# Patient Record
Sex: MALE | Race: WHITE | Employment: UNEMPLOYED | URBAN - METROPOLITAN AREA
[De-identification: names, ages, dates, MRNs, and addresses within clinical notes are randomized per-mention and may not be internally consistent; named-entity substitution may affect disease eponyms.]

---

## 2017-03-08 ENCOUNTER — HOSPITAL ENCOUNTER (OUTPATIENT)
Age: 57
Setting detail: OUTPATIENT SURGERY
Discharge: HOME | End: 2017-03-08
Attending: SURGERY | Admitting: SURGERY
Payer: MEDICARE

## 2017-03-08 ENCOUNTER — ANESTHESIA EVENT (OUTPATIENT)
Dept: SURGERY | Age: 57
End: 2017-03-08
Payer: MEDICARE

## 2017-03-08 ENCOUNTER — ANESTHESIA (OUTPATIENT)
Dept: SURGERY | Age: 57
End: 2017-03-08
Payer: MEDICARE

## 2017-03-08 VITALS
SYSTOLIC BLOOD PRESSURE: 127 MMHG | OXYGEN SATURATION: 100 % | BODY MASS INDEX: 25.34 KG/M2 | DIASTOLIC BLOOD PRESSURE: 74 MMHG | WEIGHT: 167.19 LBS | TEMPERATURE: 97.9 F | HEIGHT: 68 IN | HEART RATE: 87 BPM | RESPIRATION RATE: 16 BRPM

## 2017-03-08 PROCEDURE — 77030020782 HC GWN BAIR PAWS FLX 3M -B: Performed by: SURGERY

## 2017-03-08 PROCEDURE — 88304 TISSUE EXAM BY PATHOLOGIST: CPT | Performed by: SURGERY

## 2017-03-08 PROCEDURE — 74011250636 HC RX REV CODE- 250/636

## 2017-03-08 PROCEDURE — 77030031140 HC SUT VCRL3 J&J -A: Performed by: SURGERY

## 2017-03-08 PROCEDURE — 77030010507 HC ADH SKN DERMBND J&J -B: Performed by: SURGERY

## 2017-03-08 PROCEDURE — 77030002935 HC SUT MCRYL J&J -C: Performed by: SURGERY

## 2017-03-08 PROCEDURE — 74011000250 HC RX REV CODE- 250: Performed by: SURGERY

## 2017-03-08 PROCEDURE — 76210000021 HC REC RM PH II 0.5 TO 1 HR: Performed by: SURGERY

## 2017-03-08 PROCEDURE — 76060000032 HC ANESTHESIA 0.5 TO 1 HR: Performed by: SURGERY

## 2017-03-08 PROCEDURE — 88305 TISSUE EXAM BY PATHOLOGIST: CPT | Performed by: SURGERY

## 2017-03-08 PROCEDURE — 77030031139 HC SUT VCRL2 J&J -A: Performed by: SURGERY

## 2017-03-08 PROCEDURE — 76010000138 HC OR TIME 0.5 TO 1 HR: Performed by: SURGERY

## 2017-03-08 PROCEDURE — 76210000006 HC OR PH I REC 0.5 TO 1 HR: Performed by: SURGERY

## 2017-03-08 PROCEDURE — 74011250636 HC RX REV CODE- 250/636: Performed by: SURGERY

## 2017-03-08 PROCEDURE — 74011000250 HC RX REV CODE- 250

## 2017-03-08 PROCEDURE — 77030032490 HC SLV COMPR SCD KNE COVD -B: Performed by: SURGERY

## 2017-03-08 PROCEDURE — 77030002966 HC SUT PDS J&J -A: Performed by: SURGERY

## 2017-03-08 PROCEDURE — 77030011640 HC PAD GRND REM COVD -A: Performed by: SURGERY

## 2017-03-08 RX ORDER — PROPOFOL 10 MG/ML
INJECTION, EMULSION INTRAVENOUS AS NEEDED
Status: DISCONTINUED | OUTPATIENT
Start: 2017-03-08 | End: 2017-03-08 | Stop reason: HOSPADM

## 2017-03-08 RX ORDER — ONDANSETRON 2 MG/ML
4 INJECTION INTRAMUSCULAR; INTRAVENOUS ONCE
Status: CANCELLED | OUTPATIENT
Start: 2017-03-08 | End: 2017-03-08

## 2017-03-08 RX ORDER — FENTANYL CITRATE 50 UG/ML
50 INJECTION, SOLUTION INTRAMUSCULAR; INTRAVENOUS
Status: CANCELLED | OUTPATIENT
Start: 2017-03-08

## 2017-03-08 RX ORDER — SODIUM CHLORIDE 0.9 % (FLUSH) 0.9 %
5-10 SYRINGE (ML) INJECTION AS NEEDED
Status: CANCELLED | OUTPATIENT
Start: 2017-03-08

## 2017-03-08 RX ORDER — CEFAZOLIN SODIUM 2 G/50ML
2 SOLUTION INTRAVENOUS ONCE
Status: COMPLETED | OUTPATIENT
Start: 2017-03-08 | End: 2017-03-08

## 2017-03-08 RX ORDER — DIPHENHYDRAMINE HYDROCHLORIDE 50 MG/ML
12.5 INJECTION, SOLUTION INTRAMUSCULAR; INTRAVENOUS
Status: CANCELLED | OUTPATIENT
Start: 2017-03-08

## 2017-03-08 RX ORDER — ALBUTEROL SULFATE 0.83 MG/ML
2.5 SOLUTION RESPIRATORY (INHALATION) AS NEEDED
Status: CANCELLED | OUTPATIENT
Start: 2017-03-08

## 2017-03-08 RX ORDER — SODIUM CHLORIDE, SODIUM LACTATE, POTASSIUM CHLORIDE, CALCIUM CHLORIDE 600; 310; 30; 20 MG/100ML; MG/100ML; MG/100ML; MG/100ML
125 INJECTION, SOLUTION INTRAVENOUS CONTINUOUS
Status: DISCONTINUED | OUTPATIENT
Start: 2017-03-08 | End: 2017-03-08 | Stop reason: HOSPADM

## 2017-03-08 RX ORDER — DEXAMETHASONE SODIUM PHOSPHATE 4 MG/ML
INJECTION, SOLUTION INTRA-ARTICULAR; INTRALESIONAL; INTRAMUSCULAR; INTRAVENOUS; SOFT TISSUE AS NEEDED
Status: DISCONTINUED | OUTPATIENT
Start: 2017-03-08 | End: 2017-03-08 | Stop reason: HOSPADM

## 2017-03-08 RX ORDER — FENTANYL CITRATE 50 UG/ML
INJECTION, SOLUTION INTRAMUSCULAR; INTRAVENOUS AS NEEDED
Status: DISCONTINUED | OUTPATIENT
Start: 2017-03-08 | End: 2017-03-08 | Stop reason: HOSPADM

## 2017-03-08 RX ORDER — OXYCODONE AND ACETAMINOPHEN 5; 325 MG/1; MG/1
1-2 TABLET ORAL
Qty: 30 TAB | Refills: 0 | Status: SHIPPED | OUTPATIENT
Start: 2017-03-08 | End: 2018-04-14

## 2017-03-08 RX ORDER — GLYCOPYRROLATE 0.2 MG/ML
INJECTION INTRAMUSCULAR; INTRAVENOUS AS NEEDED
Status: DISCONTINUED | OUTPATIENT
Start: 2017-03-08 | End: 2017-03-08 | Stop reason: HOSPADM

## 2017-03-08 RX ORDER — ONDANSETRON 2 MG/ML
INJECTION INTRAMUSCULAR; INTRAVENOUS AS NEEDED
Status: DISCONTINUED | OUTPATIENT
Start: 2017-03-08 | End: 2017-03-08 | Stop reason: HOSPADM

## 2017-03-08 RX ORDER — EPHEDRINE SULFATE/0.9% NACL/PF 25 MG/5 ML
SYRINGE (ML) INTRAVENOUS AS NEEDED
Status: DISCONTINUED | OUTPATIENT
Start: 2017-03-08 | End: 2017-03-08 | Stop reason: HOSPADM

## 2017-03-08 RX ORDER — LIDOCAINE HYDROCHLORIDE 20 MG/ML
INJECTION, SOLUTION EPIDURAL; INFILTRATION; INTRACAUDAL; PERINEURAL AS NEEDED
Status: DISCONTINUED | OUTPATIENT
Start: 2017-03-08 | End: 2017-03-08 | Stop reason: HOSPADM

## 2017-03-08 RX ORDER — NALOXONE HYDROCHLORIDE 0.4 MG/ML
0.1 INJECTION, SOLUTION INTRAMUSCULAR; INTRAVENOUS; SUBCUTANEOUS AS NEEDED
Status: CANCELLED | OUTPATIENT
Start: 2017-03-08

## 2017-03-08 RX ORDER — SODIUM CHLORIDE, SODIUM LACTATE, POTASSIUM CHLORIDE, CALCIUM CHLORIDE 600; 310; 30; 20 MG/100ML; MG/100ML; MG/100ML; MG/100ML
150 INJECTION, SOLUTION INTRAVENOUS CONTINUOUS
Status: CANCELLED | OUTPATIENT
Start: 2017-03-08

## 2017-03-08 RX ORDER — MAGNESIUM SULFATE 100 %
4 CRYSTALS MISCELLANEOUS AS NEEDED
Status: CANCELLED | OUTPATIENT
Start: 2017-03-08

## 2017-03-08 RX ORDER — DEXTROSE 50 % IN WATER (D50W) INTRAVENOUS SYRINGE
25-50 AS NEEDED
Status: CANCELLED | OUTPATIENT
Start: 2017-03-08

## 2017-03-08 RX ORDER — MIDAZOLAM HYDROCHLORIDE 1 MG/ML
INJECTION, SOLUTION INTRAMUSCULAR; INTRAVENOUS AS NEEDED
Status: DISCONTINUED | OUTPATIENT
Start: 2017-03-08 | End: 2017-03-08 | Stop reason: HOSPADM

## 2017-03-08 RX ADMIN — Medication 10 MG: at 11:31

## 2017-03-08 RX ADMIN — LIDOCAINE HYDROCHLORIDE 80 MG: 20 INJECTION, SOLUTION EPIDURAL; INFILTRATION; INTRACAUDAL; PERINEURAL at 11:17

## 2017-03-08 RX ADMIN — SODIUM CHLORIDE, SODIUM LACTATE, POTASSIUM CHLORIDE, AND CALCIUM CHLORIDE 125 ML/HR: 600; 310; 30; 20 INJECTION, SOLUTION INTRAVENOUS at 08:49

## 2017-03-08 RX ADMIN — ONDANSETRON 4 MG: 2 INJECTION INTRAMUSCULAR; INTRAVENOUS at 11:25

## 2017-03-08 RX ADMIN — SODIUM CHLORIDE, SODIUM LACTATE, POTASSIUM CHLORIDE, AND CALCIUM CHLORIDE 125 ML/HR: 600; 310; 30; 20 INJECTION, SOLUTION INTRAVENOUS at 12:34

## 2017-03-08 RX ADMIN — GLYCOPYRROLATE 0.2 MG: 0.2 INJECTION INTRAMUSCULAR; INTRAVENOUS at 11:29

## 2017-03-08 RX ADMIN — DEXAMETHASONE SODIUM PHOSPHATE 4 MG: 4 INJECTION, SOLUTION INTRA-ARTICULAR; INTRALESIONAL; INTRAMUSCULAR; INTRAVENOUS; SOFT TISSUE at 11:25

## 2017-03-08 RX ADMIN — CEFAZOLIN SODIUM 2 G: 2 SOLUTION INTRAVENOUS at 11:12

## 2017-03-08 RX ADMIN — MIDAZOLAM HYDROCHLORIDE 2 MG: 1 INJECTION, SOLUTION INTRAMUSCULAR; INTRAVENOUS at 11:12

## 2017-03-08 RX ADMIN — PROPOFOL 200 MG: 10 INJECTION, EMULSION INTRAVENOUS at 11:17

## 2017-03-08 RX ADMIN — GLYCOPYRROLATE 0.2 MG: 0.2 INJECTION INTRAMUSCULAR; INTRAVENOUS at 11:27

## 2017-03-08 RX ADMIN — FENTANYL CITRATE 100 MCG: 50 INJECTION, SOLUTION INTRAMUSCULAR; INTRAVENOUS at 11:12

## 2017-03-08 NOTE — INTERVAL H&P NOTE
H&P Update:  Deepak Hernandez was seen and examined. History and physical has been reviewed. The patient has been examined.  There have been no significant clinical changes since the completion of the originally dated History and Physical.    Signed By: Vlad Haas MD     March 8, 2017 11:34 AM

## 2017-03-08 NOTE — DISCHARGE INSTRUCTIONS
Post-Operative Discharge Instructions  [employer]. Yoselin Hargrove M.D.  [address] [place] [address]  [phone]    Patient: Ladonna Villegas MRN: [id number]  CSN: [id number]    YOB: 1960  Age: 49 y.o. Sex: male    DOA: 3/8/2017 LOS:  LOS: 0 days   Discharge Date:      Acute Diagnoses:  MASS ABD WALL/LEFT GROIN    Chronic Medical Diagnoses:  Problem List as of 3/8/2017  Date Reviewed: 3/8/2017          Codes Class Noted - Resolved    Chest pain at rest ICD-10-CM: R07.9  ICD-9-CM: 786.50  2/3/2016 - Present        DDD (degenerative disc disease), cervical ICD-10-CM: M50.30  ICD-9-CM: 722.4  1/27/2016 - Present        Hx of cervical spine surgery ICD-10-CM: Z98.890  ICD-9-CM: V45.89  1/27/2016 - Present        Urinary frequency ICD-10-CM: R35.0  ICD-9-CM: 788.41  5/31/2013 - Present        Weak urinary stream ICD-10-CM: R39.12  ICD-9-CM: 788.62  5/31/2013 - Present        Urge incontinence ICD-10-CM: N39.41  ICD-9-CM: 788.31  5/31/2013 - Present        Sense of Incomplete emptying of bladder ICD-10-CM: R33.9  ICD-9-CM: 788.21  5/31/2013 - Present        BPH w LUTS ICD-10-CM: N40.1  ICD-9-CM: 600.01  5/28/2013 - Present        RESOLVED: Cervical spinal stenosis ICD-10-CM: M48.02  ICD-9-CM: 723.0  1/27/2016 - 2/3/2016        RESOLVED: HNP (herniated nucleus pulposus), cervical ICD-10-CM: M50.20  ICD-9-CM: 722.0  1/27/2016 - 2/3/2016              Diet  1. Resume prior to surgery diet as tolerated. Activity  1. Do not drive a car or operate any hazardous machinery the day of surgery. 2. Rest quietly today. 3. No bending or heavy lifting. 4. You may resume other prior to surgery activities as tolerated. 5. You may remove the bandage and shower in 1 day. Drain / Wound Care  1. Follow all drain / wound care instructions exactly as explained by the Nurse at time of discharge. 2. Apply an ice pack to the surgical site for 48 hours.   3. Do not put any salves or ointments on the wound. Allow it to form a dry scab. 4. Leave steri-strips / Dermabond alone. They should be allowed to fall off on their own in 7-14 days. Medications  1. It is important to take your medications exactly as they are prescribed. 2. Keep your medication in the bottles provided by the pharmacist, and keep a list of the medication names, dosages, and times they should be taken in your wallet. Call 911 anytime you think you may need emergency care. For example, call if:  · You passed out (lost consciousness). · You have severe trouble breathing. · You have sudden chest pain and shortness of breath. Notify your Surgeon for any of the followin. Fever, chills, nausea, vomiting, severe abdominal pain or bleeding. 2. If you experience any redness or discharge or sign of infection. 3. Persistent nausea lasting more than 24 hours. If you are unable to reach your Surgeon for any of the symptoms above, you should proceed directly to the nearest Emergency Department. Post-Operative Appointment Information    Call Dr. Meeks Prom office tomorrow morning at [phone] to schedule a post-operative office visit in one (1) week. If any questions or concerns arise, call your Surgeon at [phone].         DISCHARGE SUMMARY from Nurse    The following personal items are in your possession at time of discharge:    Dental Appliances: None  Visual Aid: Glasses, Sent home     Home Medications: None  Jewelry: None  Clothing: Pants, Shirt, Undergarments, Footwear, Socks, Sweater (locker 10)  Other Valuables: Eyeglasses, Cell Phone, Sent home, Mallory Elliott (juan lemus)             PATIENT INSTRUCTIONS:    After general anesthesia or intravenous sedation, for 24 hours or while taking prescription Narcotics:  · Limit your activities  · Do not drive and operate hazardous machinery  · Do not make important personal or business decisions  · Do  not drink alcoholic beverages  · If you have not urinated within 8 hours after discharge, please contact your surgeon on call. Report the following to your surgeon:  · Excessive pain, swelling, redness or odor of or around the surgical area  · Temperature over 100.5  · Nausea and vomiting lasting longer than 4 hours or if unable to take medications  · Any signs of decreased circulation or nerve impairment to extremity: change in color, persistent  numbness, tingling, coldness or increase pain  · Any questions        What to do at Home:  Recommended activity: Ambulate in house, No lifting, Driving, or Strenuous exercise until advised and No driving while on analgesics,     If you experience any of the following symptoms above, please follow up with Dr. Dee Cade. *  Please give a list of your current medications to your Primary Care Provider. *  Please update this list whenever your medications are discontinued, doses are      changed, or new medications (including over-the-counter products) are added. *  Please carry medication information at all times in case of emergency situations. These are general instructions for a healthy lifestyle:    No smoking/ No tobacco products/ Avoid exposure to second hand smoke    Surgeon General's Warning:  Quitting smoking now greatly reduces serious risk to your health. Obesity, smoking, and sedentary lifestyle greatly increases your risk for illness    A healthy diet, regular physical exercise & weight monitoring are important for maintaining a healthy lifestyle    You may be retaining fluid if you have a history of heart failure or if you experience any of the following symptoms:  Weight gain of 3 pounds or more overnight or 5 pounds in a week, increased swelling in our hands or feet or shortness of breath while lying flat in bed. Please call your doctor as soon as you notice any of these symptoms; do not wait until your next office visit.     Recognize signs and symptoms of STROKE:    F-face looks uneven    A-arms unable to move or move unevenly    S-speech slurred or non-existent    T-time-call 911 as soon as signs and symptoms begin-DO NOT go       Back to bed or wait to see if you get better-TIME IS BRAIN. Warning Signs of HEART ATTACK     Call 911 if you have these symptoms:   Chest discomfort. Most heart attacks involve discomfort in the center of the chest that lasts more than a few minutes, or that goes away and comes back. It can feel like uncomfortable pressure, squeezing, fullness, or pain.  Discomfort in other areas of the upper body. Symptoms can include pain or discomfort in one or both arms, the back, neck, jaw, or stomach.  Shortness of breath with or without chest discomfort.  Other signs may include breaking out in a cold sweat, nausea, or lightheadedness. Don't wait more than five minutes to call 911 - MINUTES MATTER! Fast action can save your life. Calling 911 is almost always the fastest way to get lifesaving treatment. Emergency Medical Services staff can begin treatment when they arrive -- up to an hour sooner than if someone gets to the hospital by car. Patient armband removed and shredded    The discharge information has been reviewed with the patient and caregiver. The patient and caregiver verbalized understanding. Discharge medications reviewed with the patient and caregiver and appropriate educational materials and side effects teaching were provided.

## 2017-03-08 NOTE — H&P
PATIENT: Chago Méndez  YOB: 1960  DATE: 03/02/2017 2:45 PM   VISIT TYPE: Office Visit  _____________________________________________________________    Completed Orders (this encounter)  Order Interpretation Result Next Lab Date   surgery instructions given education        Assessment/Plan  # Detail Type Description    1. Assessment Abdominal pain (R10.9). Patient Plan This appears to be a FB from previous hernia repair from a suture knot but still be a recurrent hernia. Discussed options like CT, etc.  He would like for this to be removed and explored. Understands this may also be a hernia and repair would need to be done. The risks, benefits and side effects of treatment were discussed with the patient. I have discussed the risks benefits and alternatives of the procedure to the patient including bleeding, infection, use of mesh, post op pain, reason and side effects of nerve resections,recurrence and loss of tesicle. They understand and wish to proceed. 2. Assessment Localized swelling, mass and lump, trunk (R22.2). This 49year old male presents for Hernia. History of Present Illness:  1. Hernia   Duration: 1 Month. Severity: 7. and RLQ  There is radiation to groin and lower abdomen. The patient describes it as sharp. Identified risk factors include history of hernias. Symptom is aggravated by pressure to abdomen. Pertinent negatives include abdominal distention, anorexia, back pain, bloating, blood in stool, constipation, cough, diaphoresis, diarrhea, dizziness, dyspnea, epigastric pain, eructation, fatigue, fever, flank pain, flatulence, heartburn, hematuria, jaundice, lightheadedness, menstruation, milk/dairy intolerance, myalgia, nausea, post prandial fullness, reflux, scrotal swelling, vomiting, weight gain and weight loss. Additional information: Multiple abd complaints. Had previous open RIH by Dr. Simone Madera.   Now has nodule LLQ/groin which appears to be a recurrance vs a FB and suture material.. PAST MEDICAL/SURGICAL HISTORY  (Detailed)    Disease/disorder Onset Date Management Date Comments   Hernia       Kidney stones       Low Back Pain       GERD         Family History:  (Detailed)  Relationship Family Member Name  Age at Death Condition Onset Age Cause of Death       Family history of Coronary artery disease  N       Family history of Cancer, pancreas  N       Social History:  (Detailed)  Tobacco use reviewed. The patient is right-handed. Preferred language is [place]. MARITAL STATUS/FAMILY/SOCIAL SUPPORT  Currently . CHILDREN  Has children:  1 son(s). 1 daughter(s). Smoking status: Never smoker. SMOKING STATUS  Use Status Type Smoking Status Usage Per Day Years Used Total Pack Years   no/never  Never smoker          No passive smoke exposure. ALCOHOL  There is no history of alcohol use. CAFFEINE  The patient uses caffeine: tea - 3 cups a day. LIFESTYLE  Moderate activity level. Never a health club member. Exercise includes walking. Exercises occasionally. Hobbies include Garden. SLEEP PATTERNS  Patient averages 5 hours of sleep per night. Patient has changes to sleep patterns. They do not have trouble falling asleep. They do not have difficulty staying asleep. They experience 2 waking episodes per night. They do not experience any episodes of disrupted breathing, gasping, gagging or choking for air during sleep. Buddhist/SPIRITUAL  The patient has None Jewish affiliation. HOME ENVIRONMENT/SAFETY  The home has smoke detectors. Uses seat belts.  EXPERIENCE  Patient has no  experience. Patient Status   Completed with information received for patient transitioning into care. Completed with information received for patient in a summary of care record. Medication Reconciliation  Medications reconciled today.   Medication Reviewed  Adherence Medication Name Sig Desc Elsewhere Status   taking as directed ibuprofen 800 mg tablet take 1 tablet by oral route 3 times every day with food Y Verified   taking as directed pantoprazole 40 mg tablet,delayed release take 1 tablet by oral route  every day Y Verified   taking as directed Keflex 500 mg capsule take 1 capsule by oral route four times a day for 10 days N Verified     Allergies:  Ingredient Reaction Medication Name Comment   ACETAMINOPHEN  Percocet kidney failure   OXYCODONE HCL  Percocet kidney failure   EGG Stomach Pain     CODEINE   kidney failure   HYDROCODONE GI problems  kidney failure   SULFAMETHOXAZOLE GI problems SEPTRA Kidney failure   CIPROFLOXACIN Hives/Skin Rash     (Reviewed, no changes.)  Review of Systems  System Neg/Pos Details   Constitutional Negative Fatigue, fever, night sweats, weight gain and weight loss. ENMT Negative Hearing loss, tinnitus, vertigo and voice change. Eyes Negative Diplopia and vision loss. Respiratory Negative Asthma, cough, dyspnea, hemoptysis, known TB exposure and wheezing. Cardio Negative Chest pain, claudication, edema, irregular heartbeat/palpitations and thrombophlebitis. GI Negative Abdominal distention, anorexia, bloating, blood in stool, constipation, diarrhea, dysphagia, epigastric pain, eructation, flatulence, heartburn, hemorrhoids, jaundice, milk/diary intolerance, nausea, post prandial fullness, reflux and vomiting.  Negative Back pain, dysuria, flank pain, hematuria, menstruation, nocturia, passage stone/gravel, scrotal swelling and urinary incontinence. Endocrine Negative Cold intolerance, diaphoresis and goiter. Neuro Negative Dizziness, focal weakness, headache, lightheadedness, paresthesia, seizures and syncope. Integumentary Negative Change in shape/size of mole(s) and skin lesion. MS Negative Back pain, bone/joint symptoms, muscle weakness and myalgia. Hema/Lymph Negative Easy bleeding and easy bruising.    Allergic/Immuno Negative Contact allergy and contact dermatitis. Vital Signs     Height  Time ft in cm Last Measured Height Position   2:34 PM 5.0 8.00 172.72 03/02/2017 Standing     Weight/BSA/BMI  Time lb oz kg Context BMI kg/m2 BSA m2   2:34 .00  77.111 dressed with shoes 25.85      Blood Pressure  Time BP mm/Hg Position Side Site Method Cuff Size   2:34 /66 sitting right arm automatic adult     Temperature/Pulse/Respiration  Time Temp F Temp C Temp Site Pulse/min Pattern Resp/ min   2:34 PM 97.20 36.22 ear 65 regular      Measured By  Time Measured by   2:34 PM Sergo Salas       Physical Exam:  Exam Findings Details   Constitutional Normal Well developed. Eyes Normal Conjunctiva - Right: Normal, Left: Normal. Pupil - Right: Normal, Left: Normal.   Ears Normal Inspection - Right: Normal, Left: Normal. Hearing - Right: Normal, Left: Normal.   Nose/Mouth/Throat Normal External nose - Normal. Lips/teeth/gums - Normal. Oropharynx - Normal.   Neck Exam Normal Inspection - Normal. Palpation - Normal. Thyroid gland - Normal.   Lymph Detail Normal No cervical, supraclavicular, or axillary adenopathy. Respiratory Normal Inspection - Normal. Auscultation - Normal. Effort - Normal.   Cardiovascular Normal Regular rate and rhythm. No murmurs, gallops, or rubs. Vascular Normal Pulses - Dorsalis pedis: Normal. Capillary refill - Less than 2 seconds. Abdomen * Abdominal tenderness - LLQ. Palpable mass - Size: 2cm. Location: LLQ. Characteristic: firm. Abdomen Normal Patient is not obese. Umbilicus - Normal. No spleen enlargement. No hernia. No ascites. Genitourinary Normal Scrotum - Normal. Testes - Normal. No hernia. Skin Normal Inspection - Normal.   Musculoskeletal Normal Visual overview of all four extremities is normal.   Extremity Normal No edema. Neurological Normal Memory - Normal. Cranial nerves - Cranial nerves I grossly intact, Cranial nerves II through XII grossly intact.    Psychiatric Normal Orientation - Oriented to time, place, person & situation. Appropriate mood and affect. Normal insight. Normal judgment. Medications (added, continued, or stopped this visit):  Started Medication Directions Instruction Stopped   02/02/2017 ibuprofen 800 mg tablet take 1 tablet by oral route 3 times every day with food     02/23/2017 Keflex 500 mg capsule take 1 capsule by oral route four times a day for 10 days     02/03/2017 pantoprazole 40 mg tablet,delayed release take 1 tablet by oral route  every day     Completed by: Leatha Jain 03/02/2017 3:48 PM   Document generated by:  Alessandra Parham 03/02/2017 03:48 PM     -----------------------------------------------------------------------------------------------------------                          Electronically signed by Alessandra Parham MD on 03/02/2017 04:17 PM

## 2017-03-08 NOTE — PERIOP NOTES
[facility] med list reviewed and verified - no duplicates with NORBERTO Harper RN - common med side effects handout to pt

## 2017-03-08 NOTE — BRIEF OP NOTE
BRIEF OPERATIVE NOTE    Date of Procedure: 3/8/2017   Preoperative Diagnosis: GRANULOMA LEFT ABDOMINAL WALL, LEFT ILEOINGUINAL NEUROMA, RECURRENT LEFT INGUINAL HERNIA  Postoperative Diagnosis: GRANULOMA LEFT ABDOMINAL WALL, LEFT ILEOINGUINAL NEUROMA, RECURRENT LEFT INGUINAL     Procedure(s):  EXCISION LEFT ABDOMINAL WALL GRANULOMA, REPAIR RECURRENT LEFT INGUINAL HERNA, REPAIR LEFT ILEOINGUINAL NERVE  Surgeon(s) and Role:     * Elizabeth Dick MD - Primary            Surgical Staff:  Circ-1: Katherine Colunga RN  Circ-2: Mikel Sarmiento RN  Scrub Tech-1: Usama Bautista  Surg Asst-1: Maggie Ulloa  Event Time In   Incision Start 1140   Incision Close      Anesthesia: General   Estimated Blood Loss: min  Specimens:   ID Type Source Tests Collected by Time Destination   1 : Left groin granuloma Preservative   Elizabeth Dick MD 3/8/2017 1144 Pathology      Findings: Tony Swan   Complications: none  Implants: * No implants in log *

## 2017-03-08 NOTE — PERIOP NOTES
TRANSFER - OUT REPORT:    Verbal report given to KELTON Cherry on [employer]  being transferred to phase 2 (unit) for routine post - op       Report consisted of patients Situation, Background, Assessment and   Recommendations(SBAR). Information from the following report(s) SBAR, Intake/Output and MAR was reviewed with the receiving nurse. Lines:   Peripheral IV 03/08/17 Right Hand (Active)   Site Assessment Clean, dry, & intact 3/8/2017 12:34 PM   Phlebitis Assessment 0 3/8/2017 12:34 PM   Infiltration Assessment 0 3/8/2017 12:34 PM   Dressing Status Clean, dry, & intact 3/8/2017 12:34 PM   Dressing Type Tape 3/8/2017 12:34 PM   Hub Color/Line Status Pink; Infusing 3/8/2017 12:34 PM        Opportunity for questions and clarification was provided.       Patient transported with:   Brilig

## 2017-03-08 NOTE — PERIOP NOTES
TRANSFER - IN REPORT:    Verbal report received from CRNA & ORN on Angelic Wills  being received from OR unit) for routine post - op      Report consisted of patients Situation, Background, Assessment and   Recommendations(SBAR). Information from the following report(s) SBAR, Intake/Output and MAR was reviewed with the receiving nurse. Opportunity for questions and clarification was provided. Assessment completed upon patients arrival to unit and care assumed.

## 2017-03-08 NOTE — ANESTHESIA PREPROCEDURE EVALUATION
Anesthetic History   No history of anesthetic complications            Review of Systems / Medical History  Patient summary reviewed, nursing notes reviewed and pertinent labs reviewed    Pulmonary  Within defined limits                 Neuro/Psych   Within defined limits           Cardiovascular                  Exercise tolerance: >4 METS     GI/Hepatic/Renal  Within defined limits              Endo/Other        Arthritis     Other Findings              Physical Exam    Airway  Mallampati: II  TM Distance: 4 - 6 cm  Neck ROM: normal range of motion   Mouth opening: Normal     Cardiovascular  Regular rate and rhythm,  S1 and S2 normal,  no murmur, click, rub, or gallop             Dental  No notable dental hx       Pulmonary  Breath sounds clear to auscultation               Abdominal  GI exam deferred       Other Findings            Anesthetic Plan    ASA: 2  Anesthesia type: general          Induction: Intravenous  Anesthetic plan and risks discussed with: Patient

## 2017-03-08 NOTE — IP AVS SNAPSHOT
[place] [address] [address] 
[phone] Patient: Gaston Britt MRN: [id number] CWL:5[birth year]70 You are allergic to the following Allergen Reactions Ciprofloxacin Anaphylaxis Flomax (Tamsulosin) Other (comments) Flu like sxs; Septra (Sulfamethoprim Ds) Anaphylaxis Uroxatral (Alfuzosin) Other (comments) Severe headache with every dose Codeine Other (comments) Abdominal pain Recent Documentation Height Weight BMI Smoking Status 1.727 m 75.8 kg 25.42 kg/m2 Former Smoker Emergency Contacts Name Discharge Info Relation Home Work Mobile Tierra Miner DISCHARGE CAREGIVER [3] Friend [5] [phone] About your hospitalization You were admitted on:  March 8, 2017 [facility] You were discharged on:  March 8, 2017 Unit phone number:  [phone] Why you were hospitalized Your primary diagnosis was:  Not on File Providers Seen During Your Hospitalizations Provider Role Specialty Primary office phone Mj Roe MD Attending Provider General Surgery [phone] Your Primary Care Physician (PCP) Primary Care Physician Office Phone Office Fax NONE ** None ** ** None ** Follow-up Information Follow up With Details Comments Contact Info None   None ([phone]) Patient stated that they have no PCP Mj Roe MD Follow up in 1 week(s)  [address] [address] 
[phone] Current Discharge Medication List  
  
START taking these medications Dose & Instructions Dispensing Information Comments Morning Noon Evening Bedtime  
 oxyCODONE-acetaminophen 5-325 mg per tablet Commonly known as:  PERCOCET Your next dose is: Today, Tomorrow Other:  _________ Dose:  1-2 Tab Take 1-2 Tabs by mouth every four (4) hours as needed for Pain. Max Daily Amount: 12 Tabs. Quantity:  30 Tab Refills:  0 CONTINUE these medications which have NOT CHANGED Dose & Instructions Dispensing Information Comments Morning Noon Evening Bedtime  
 acetaminophen 325 mg tablet Commonly known as:  TYLENOL Your next dose is: Today, Tomorrow Other:  _________ Dose:  650 mg Take 650 mg by mouth every four (4) hours as needed for Pain. Refills:  0  
     
   
   
   
  
 cephALEXin 500 mg capsule Commonly known as:  [place] Your next dose is: Today, Tomorrow Other:  _________ Dose:  500 mg Take 500 mg by mouth four (4) times daily. Refills:  0 Where to Get Your Medications Information on where to get these meds will be given to you by the nurse or doctor. ! Ask your nurse or doctor about these medications  
  oxyCODONE-acetaminophen 5-325 mg per tablet Discharge Instructions Post-Operative Discharge Instructions Rocky Parham M.D. 
[address] [place] [address] 
[phone] Patient: Camilo Cardenas MRN: [id number]  CSN: [id number] YOB: 1960  Age: 49 y.o. Sex: male DOA: 3/8/2017 LOS:  LOS: 0 days   Discharge Date:   
 
Acute Diagnoses: MASS ABD WALL/LEFT GROIN Chronic Medical Diagnoses: 
Problem List as of 3/8/2017  Date Reviewed: 3/8/2017 Codes Class Noted - Resolved Chest pain at rest ICD-10-CM: R07.9 ICD-9-CM: 786.50  2/3/2016 - Present DDD (degenerative disc disease), cervical ICD-10-CM: M50.30 ICD-9-CM: 722.4  1/27/2016 - Present Hx of cervical spine surgery ICD-10-CM: Z98.890 ICD-9-CM: V45.89  1/27/2016 - Present Urinary frequency ICD-10-CM: R35.0 ICD-9-CM: 788.41  5/31/2013 - Present  Weak urinary stream ICD-10-CM: R39.12 
 ICD-9-CM: 788.62  2013 - Present Urge incontinence ICD-10-CM: N39.41 
ICD-9-CM: 788.31  2013 - Present Sense of Incomplete emptying of bladder ICD-10-CM: R33.9 ICD-9-CM: 788.21  2013 - Present BPH w LUTS ICD-10-CM: N40.1 ICD-9-CM: 600.01  2013 - Present RESOLVED: Cervical spinal stenosis ICD-10-CM: M48.02 
ICD-9-CM: 723.0  2016 - 2/3/2016 RESOLVED: HNP (herniated nucleus pulposus), cervical ICD-10-CM: M50.20 ICD-9-CM: 722.0  2016 - 2/3/2016 Diet 1. Resume prior to surgery diet as tolerated. Activity 1. Do not drive a car or operate any hazardous machinery the day of surgery. 2. Rest quietly today. 3. No bending or heavy lifting. 4. You may resume other prior to surgery activities as tolerated. 5. You may remove the bandage and shower in 1 day. Drain / Wound Care 1. Follow all drain / wound care instructions exactly as explained by the Nurse at time of discharge. 2. Apply an ice pack to the surgical site for 48 hours. 3. Do not put any salves or ointments on the wound. Allow it to form a dry scab. 4. Leave steri-strips / Dermabond alone. They should be allowed to fall off on their own in 7-14 days. Medications 1. It is important to take your medications exactly as they are prescribed. 2. Keep your medication in the bottles provided by the pharmacist, and keep a list of the medication names, dosages, and times they should be taken in your wallet. Call 911 anytime you think you may need emergency care. For example, call if: 
· You passed out (lost consciousness). · You have severe trouble breathing. · You have sudden chest pain and shortness of breath. Notify your Surgeon for any of the followin. Fever, chills, nausea, vomiting, severe abdominal pain or bleeding. 2. If you experience any redness or discharge or sign of infection. 3. Persistent nausea lasting more than 24 hours. If you are unable to reach your Surgeon for any of the symptoms above, you should proceed directly to the nearest Emergency Department. Post-Operative Appointment Information Call Dr. Juju Del Toro office tomorrow morning at [phone] to schedule a post-operative office visit in one (1) week. If any questions or concerns arise, call your Surgeon at [phone]. DISCHARGE SUMMARY from Nurse The following personal items are in your possession at time of discharge: 
 
Dental Appliances: None Visual Aid: Glasses, Sent home Home Medications: None Jewelry: None Clothing: Pants, Shirt, Undergarments, Footwear, Socks, Sweater (locker 10) Other Valuables: Eyeglasses, [employer], Sent home, Jacobo Casey (to [employer]) PATIENT INSTRUCTIONS: 
 
After general anesthesia or intravenous sedation, for 24 hours or while taking prescription Narcotics: · Limit your activities · Do not drive and operate hazardous machinery · Do not make important personal or business decisions · Do  not drink alcoholic beverages · If you have not urinated within 8 hours after discharge, please contact your surgeon on call. Report the following to your surgeon: 
· Excessive pain, swelling, redness or odor of or around the surgical area · Temperature over 100.5 · Nausea and vomiting lasting longer than 4 hours or if unable to take medications · Any signs of decreased circulation or nerve impairment to extremity: change in color, persistent  numbness, tingling, coldness or increase pain · Any questions What to do at Home: 
Recommended activity: Ambulate in house, No lifting, Driving, or Strenuous exercise until advised and No driving while on analgesics, If you experience any of the following symptoms above, please follow up with Dr. Carmine Murillo. *  Please give a list of your current medications to your Primary Care Provider.  
 
*  Please update this list whenever your medications are discontinued, doses are 
    changed, or new medications (including over-the-counter products) are added. *  Please carry medication information at all times in case of emergency situations. These are general instructions for a healthy lifestyle: No smoking/ No tobacco products/ Avoid exposure to second hand smoke Surgeon General's Warning:  Quitting smoking now greatly reduces serious risk to your health. Obesity, smoking, and sedentary lifestyle greatly increases your risk for illness A healthy diet, regular physical exercise & weight monitoring are important for maintaining a healthy lifestyle You may be retaining fluid if you have a history of heart failure or if you experience any of the following symptoms:  Weight gain of 3 pounds or more overnight or 5 pounds in a week, increased swelling in our hands or feet or shortness of breath while lying flat in bed. Please call your doctor as soon as you notice any of these symptoms; do not wait until your next office visit. Recognize signs and symptoms of STROKE: 
 
F-face looks uneven A-arms unable to move or move unevenly S-speech slurred or non-existent T-time-call 911 as soon as signs and symptoms begin-DO NOT go Back to bed or wait to see if you get better-TIME IS BRAIN. Warning Signs of HEART ATTACK Call 911 if you have these symptoms: 
? Chest discomfort. Most heart attacks involve discomfort in the center of the chest that lasts more than a few minutes, or that goes away and comes back. It can feel like uncomfortable pressure, squeezing, fullness, or pain. ? Discomfort in other areas of the upper body. Symptoms can include pain or discomfort in one or both arms, the back, neck, jaw, or stomach. ? Shortness of breath with or without chest discomfort. ? Other signs may include breaking out in a cold sweat, nausea, or lightheadedness. Don't wait more than five minutes to call [address]!  Fast action can save your life. Calling 911 is almost always the fastest way to get lifesaving treatment. Emergency Medical Services staff can begin treatment when they arrive  up to an hour sooner than if someone gets to the hospital by car. Patient {ARMLUIS:20124} The discharge information has been reviewed with the patient and caregiver. The patient and caregiver verbalized understanding. Discharge medications reviewed with the patient and caregiver and appropriate educational materials and side effects teaching were provided. Discharge Orders None Introducing [facility]! [employer] introduces [employer] patient portal. Now you can access parts of your medical record, email your doctor's office, and request medication refills online. 1. In your internet browser, go to [email or web address] 2. Click on the First Time User? Click Here link in the Sign In box. You will see the New Member Sign Up page. 3. Enter your [employer] Access Code exactly as it appears below. You will not need to use this code after youve completed the sign-up process. If you do not sign up before the expiration date, you must request a new code. · [employer] Access Code: [email or web address] Expires: 5/31/2017  6:48 PM 
 
4. Enter the last four digits of your Social Security Number (xxxx) and Date of Birth (mm/dd/yyyy) as indicated and click Submit. You will be taken to the next sign-up page. 5. Create a [employer] ID. This will be your [employer] login ID and cannot be changed, so think of one that is secure and easy to remember. 6. Create a [employer] password. You can change your password at any time. 7. Enter your Password Reset Question and Answer. This can be used at a later time if you forget your password. 8. Enter your e-mail address. You will receive e-mail notification when new information is available in [address] 9. Click Sign Up. You can now view and download portions of your medical record. 10. Click the Download Summary menu link to download a portable copy of your medical information. If you have questions, please visit the Frequently Asked Questions section of the [employer] website. Remember, [employer] is NOT to be used for urgent needs. For medical emergencies, dial 911. Now available from your iPhone and Android! General Information Please provide this summary of care documentation to your next provider. Patient Signature:  ____________________________________________________________ Date:  ____________________________________________________________  
  
Zacarias Shove Provider Signature:  ____________________________________________________________ Date:  ____________________________________________________________

## 2017-03-08 NOTE — ANESTHESIA POSTPROCEDURE EVALUATION
Post-Anesthesia Evaluation and Assessment    Cardiovascular Function/Vital Signs  Visit Vitals    /76    Pulse 87    Temp 36.4 °C (97.5 °F)    Resp 12    Ht 5' 8\" (1.727 m)    Wt 75.8 kg (167 lb 3 oz)    SpO2 96%    BMI 25.42 kg/m2       Patient is status post Procedure(s):  EXCISION LEFT ABDOMINAL WALL GRANULOMA, REPAIR RECURRENT LEFT INGUINAL HERNA, REPAIR LEFT ILEOINGUINAL NERVE. Nausea/Vomiting: Controlled. Postoperative hydration reviewed and adequate. Pain:  Pain Scale 1: Numeric (0 - 10) (03/08/17 1236)  Pain Intensity 1: 0 (03/08/17 1236)   Managed. Neurological Status:   Neuro (WDL): Within Defined Limits (03/08/17 1235)   At baseline. Mental Status and Level of Consciousness: Arousable. Pulmonary Status:   O2 Device: Room air (03/08/17 1235)   Adequate oxygenation and airway patent. Complications related to anesthesia: None    Post-anesthesia assessment completed. No concerns. Patient has met all discharge requirements.     Signed By: Nasreen Ash CRNA    March 8, 2017

## 2017-03-17 NOTE — OP NOTES
[address] [place] [address]  OPERATIVE REPORT    Name:  Marbella Leal  MR#:  [id number]  :  1960  Account #:  [de-identified]  Date of Adm:  2017  Date of Surgery:  2017      PREOPERATIVE DIAGNOSES  1. Recurrent left inguinal hernia. 2. Foreign body, left groin. 3. Neuroma, neuralgia, left ilioinguinal nerve. POSTOPERATIVE DIAGNOSES  !. Recurrent left inguinal hernia. 2. Foreign body, left groin. 3. Neuroma neuralgia, left ilioinguinal nerve. SURGEON: Terri Aggarwal MD    PROCEDURES PERFORMED  1. Repair of recurrent left inguinal hernia. 2. Resection of left ilioinguinal and iliohypogastric nerve with  implantation of nerve. 3. Removal and resection of foreign body suture and mesh. SURGEON: Terri Aggarwal MD    ANESTHESIA: General endotracheal.    ESTIMATED BLOOD LOSS: Minimal.    SPECIMENS REMOVED: Nerve and mesh. INDICATIONS: This is a gentleman who presents with a recurrent left  inguinal hernia with pain and foreign body palpable underneath the  incision. He will undergo exploration with excision of any foreign body  and because of this pain resection of the inguinal nerves and repair of  recurrent inguinal hernia if needed. DESCRIPTION OF PROCEDURE: He was placed in supine position. His left groin was prepped and draped in the usual fashion. An incision  was made over the left inguinal ligament with a knife, carried down  through subcutaneous tissues with electrocautery. The external  oblique fibers were very attenuated and there was a foreign body  reaction secondary to what appeared to be a Prolene suture knot. This  was associated with previously placed mesh. The external oblique  fibers were slightly divided and the foreign body reaction including the  mesh and suture material were excised using suture scissors and the  electrocautery.  Once this was done, the ilioinguinal nerve, which  appeared to have a lot of fibrosis and a neuroma was excised, along  with the iliohypogastric nerve with the electrocautery. The proximal  nerve endings were embedded in some muscle using a 2-0 Vicryl  suture. The resulting defect of the inguinal floor was a partial defect. I  did not excise all the mesh, particularly the mesh that was inferior near  the pubis. This appeared to be intact. Because of the resulting  weakness at the superior aspect of the inguinal floor, I elected to patch  this with ProGrip mesh. The mesh was fashioned and placed over the  inguinal floor and allowed to self . Once this was done, the external  oblique fibers were reapproximated using 2-0 PDS continuous suture. Marcaine 0.25% was injected locally. Subcutaneous tissues were  closed in layers using 3-0 Vicryl suture and the skin was closed with 4-  0 Monocryl subcuticular closure and Dermabond.         MD SANAM Sutton / TB  D:  03/17/2017   16:55  T:  03/17/2017   17:31  Job #:  [id number]

## 2018-04-09 ENCOUNTER — HOSPITAL ENCOUNTER (OUTPATIENT)
Dept: GENERAL RADIOLOGY | Age: 58
Discharge: HOME | End: 2018-04-09
Payer: MEDICARE

## 2018-04-09 DIAGNOSIS — S22.42XA MULTIPLE CLOSED FRACTURES OF RIBS OF LEFT SIDE: ICD-10-CM

## 2018-04-09 PROCEDURE — 71101 X-RAY EXAM UNILAT RIBS/CHEST: CPT

## 2018-04-14 ENCOUNTER — APPOINTMENT (OUTPATIENT)
Dept: CT IMAGING | Age: 58
End: 2018-04-14
Attending: NURSE PRACTITIONER
Payer: MEDICARE

## 2018-04-14 ENCOUNTER — APPOINTMENT (OUTPATIENT)
Dept: GENERAL RADIOLOGY | Age: 58
End: 2018-04-14
Attending: NURSE PRACTITIONER
Payer: MEDICARE

## 2018-04-14 ENCOUNTER — HOSPITAL ENCOUNTER (EMERGENCY)
Age: 58
Discharge: HOME | End: 2018-04-14
Attending: INTERNAL MEDICINE
Payer: MEDICARE

## 2018-04-14 VITALS
HEART RATE: 69 BPM | TEMPERATURE: 97.8 F | BODY MASS INDEX: 26.98 KG/M2 | DIASTOLIC BLOOD PRESSURE: 81 MMHG | SYSTOLIC BLOOD PRESSURE: 133 MMHG | WEIGHT: 178 LBS | HEIGHT: 68 IN | OXYGEN SATURATION: 100 % | RESPIRATION RATE: 15 BRPM

## 2018-04-14 DIAGNOSIS — R07.89 CHEST WALL PAIN: Primary | ICD-10-CM

## 2018-04-14 DIAGNOSIS — R55 SYNCOPE AND COLLAPSE: ICD-10-CM

## 2018-04-14 LAB
ALBUMIN SERPL-MCNC: 4.2 G/DL (ref 3.4–5)
ALBUMIN/GLOB SERPL: 1.2 {RATIO} (ref 0.8–1.7)
ALP SERPL-CCNC: 89 U/L (ref 45–117)
ALT SERPL-CCNC: 31 U/L (ref 16–61)
ANION GAP SERPL CALC-SCNC: 7 MMOL/L (ref 3–18)
APPEARANCE UR: CLEAR
AST SERPL-CCNC: 19 U/L (ref 15–37)
BASOPHILS # BLD: 0 K/UL (ref 0–0.06)
BASOPHILS NFR BLD: 1 % (ref 0–2)
BILIRUB SERPL-MCNC: 0.5 MG/DL (ref 0.2–1)
BILIRUB UR QL: NEGATIVE
BUN SERPL-MCNC: 20 MG/DL (ref 7–18)
BUN/CREAT SERPL: 17 (ref 12–20)
CALCIUM SERPL-MCNC: 8.7 MG/DL (ref 8.5–10.1)
CHLORIDE SERPL-SCNC: 104 MMOL/L (ref 100–108)
CK MB CFR SERPL CALC: 1.1 % (ref 0–4)
CK MB SERPL-MCNC: 1.4 NG/ML (ref 5–25)
CK SERPL-CCNC: 127 U/L (ref 39–308)
CO2 SERPL-SCNC: 32 MMOL/L (ref 21–32)
COLOR UR: YELLOW
CREAT SERPL-MCNC: 1.16 MG/DL (ref 0.6–1.3)
DIFFERENTIAL METHOD BLD: ABNORMAL
EOSINOPHIL # BLD: 0.4 K/UL (ref 0–0.4)
EOSINOPHIL NFR BLD: 4 % (ref 0–5)
ERYTHROCYTE [DISTWIDTH] IN BLOOD BY AUTOMATED COUNT: 12.9 % (ref 11.6–14.5)
GLOBULIN SER CALC-MCNC: 3.5 G/DL (ref 2–4)
GLUCOSE SERPL-MCNC: 113 MG/DL (ref 74–99)
GLUCOSE UR STRIP.AUTO-MCNC: NEGATIVE MG/DL
HCT VFR BLD AUTO: 46 % (ref 36–48)
HGB BLD-MCNC: 16.1 G/DL (ref 13–16)
HGB UR QL STRIP: NEGATIVE
KETONES UR QL STRIP.AUTO: NEGATIVE MG/DL
LEUKOCYTE ESTERASE UR QL STRIP.AUTO: NEGATIVE
LIPASE SERPL-CCNC: 137 U/L (ref 73–393)
LYMPHOCYTES # BLD: 2.6 K/UL (ref 0.9–3.6)
LYMPHOCYTES NFR BLD: 32 % (ref 21–52)
MAGNESIUM SERPL-MCNC: 2 MG/DL (ref 1.6–2.6)
MCH RBC QN AUTO: 31.6 PG (ref 24–34)
MCHC RBC AUTO-ENTMCNC: 35 G/DL (ref 31–37)
MCV RBC AUTO: 90.2 FL (ref 74–97)
MONOCYTES # BLD: 0.5 K/UL (ref 0.05–1.2)
MONOCYTES NFR BLD: 7 % (ref 3–10)
NEUTS SEG # BLD: 4.6 K/UL (ref 1.8–8)
NEUTS SEG NFR BLD: 56 % (ref 40–73)
NITRITE UR QL STRIP.AUTO: NEGATIVE
PH UR STRIP: 5 [PH] (ref 5–8)
PLATELET # BLD AUTO: 242 K/UL (ref 135–420)
PMV BLD AUTO: 8.3 FL (ref 9.2–11.8)
POTASSIUM SERPL-SCNC: 3.7 MMOL/L (ref 3.5–5.5)
PROT SERPL-MCNC: 7.7 G/DL (ref 6.4–8.2)
PROT UR STRIP-MCNC: NEGATIVE MG/DL
RBC # BLD AUTO: 5.1 M/UL (ref 4.7–5.5)
SODIUM SERPL-SCNC: 143 MMOL/L (ref 136–145)
SP GR UR REFRACTOMETRY: 1.03 (ref 1–1.03)
TROPONIN I SERPL-MCNC: <0.02 NG/ML (ref 0–0.06)
UROBILINOGEN UR QL STRIP.AUTO: 1 EU/DL (ref 0.2–1)
WBC # BLD AUTO: 8.1 K/UL (ref 4.6–13.2)

## 2018-04-14 PROCEDURE — 96360 HYDRATION IV INFUSION INIT: CPT

## 2018-04-14 PROCEDURE — 70450 CT HEAD/BRAIN W/O DYE: CPT

## 2018-04-14 PROCEDURE — 74011250636 HC RX REV CODE- 250/636: Performed by: INTERNAL MEDICINE

## 2018-04-14 PROCEDURE — 85025 COMPLETE CBC W/AUTO DIFF WBC: CPT | Performed by: INTERNAL MEDICINE

## 2018-04-14 PROCEDURE — 80053 COMPREHEN METABOLIC PANEL: CPT | Performed by: INTERNAL MEDICINE

## 2018-04-14 PROCEDURE — 72125 CT NECK SPINE W/O DYE: CPT

## 2018-04-14 PROCEDURE — 81003 URINALYSIS AUTO W/O SCOPE: CPT | Performed by: INTERNAL MEDICINE

## 2018-04-14 PROCEDURE — 71101 X-RAY EXAM UNILAT RIBS/CHEST: CPT

## 2018-04-14 PROCEDURE — 99284 EMERGENCY DEPT VISIT MOD MDM: CPT

## 2018-04-14 PROCEDURE — 82550 ASSAY OF CK (CPK): CPT | Performed by: INTERNAL MEDICINE

## 2018-04-14 PROCEDURE — 83690 ASSAY OF LIPASE: CPT | Performed by: INTERNAL MEDICINE

## 2018-04-14 PROCEDURE — 83735 ASSAY OF MAGNESIUM: CPT | Performed by: INTERNAL MEDICINE

## 2018-04-14 PROCEDURE — 93005 ELECTROCARDIOGRAM TRACING: CPT

## 2018-04-14 RX ADMIN — SODIUM CHLORIDE 1000 ML: 900 INJECTION, SOLUTION INTRAVENOUS at 11:57

## 2018-04-14 NOTE — ED NOTES
Assumed care for discharge only Pt discharged per ambulatory, provider gave pt verbal inst. Pt left prior to written inst . Provider aware.

## 2018-04-14 NOTE — ED PROVIDER NOTES
EMERGENCY DEPARTMENT HISTORY AND PHYSICAL EXAM    Date: 4/14/2018  Patient Name: Radha Zhao    History of Presenting Illness     Chief Complaint   Patient presents with    Abdominal Pain         History Provided By: Patient    Chief Complaint: Left rib pain  Duration: 1 week  Timing:  Acute and Worsening  Location: Left ribs  Severity: 8 out of 10  Modifying Factors: Worse with inspiration  Associated Symptoms: n/v    Additional History (Context):   11:52 AM  Radha Zhao is a 56 y.o. male with PMHX kidney stone, chronic pain, BPH presents to the emergency department C/O left rib pain, onset 1 week ago. Worse with inspiration. Associated sxs include n/v. Pt states he passed out 1 week ago and hit his face on the table while running to the sink because he choked on some hot tea. Pt states he was evaluated here after syncopal episode and says \"they said everything was fine. \" Pt denies PMHX DM, DVT, CAD, passing out today, incontinence of bowel and/or bladder, use of EtOH/tobacco/illicit drugs, leg swelling, and any other sxs or complaints. PCP: None    Current Outpatient Prescriptions   Medication Sig Dispense Refill    acetaminophen (TYLENOL) 325 mg tablet Take 650 mg by mouth every four (4) hours as needed for Pain. Past History     Past Medical History:  Past Medical History:   Diagnosis Date    Arthritis     BPH (benign prostatic hyperplasia)     Chronic pain     back and hands    Kidney stone     Kidney stones        Past Surgical History:  Past Surgical History:   Procedure Laterality Date    HX CARPAL TUNNEL RELEASE      HX CHOLECYSTECTOMY      HX HERNIA REPAIR      HX ORTHOPAEDIC      lumbar fulson, cerv fusino    HX UROLOGICAL      stent for kidney stones       Family History:  History reviewed. No pertinent family history.     Social History:  Social History   Substance Use Topics    Smoking status: Former Smoker     Quit date: 1/18/1980    Smokeless tobacco: Never Used   Renee Joseph Alcohol use No       Allergies: Allergies   Allergen Reactions    Ciprofloxacin Anaphylaxis    Flomax [Tamsulosin] Other (comments)     Flu like sxs;     Septra [Sulfamethoprim Ds] Anaphylaxis    Uroxatral [Alfuzosin] Other (comments)     Severe headache with every dose    Codeine Other (comments)     Abdominal pain         Review of Systems   Review of Systems   Constitutional: Negative for fever. Gastrointestinal: Positive for nausea and vomiting. Musculoskeletal: Positive for arthralgias (left rib pain). All other systems reviewed and are negative. Physical Exam     Vitals:    04/14/18 1139 04/14/18 1311 04/14/18 1416   BP: 119/82 129/83 133/81   Pulse: 70 68 69   Resp: 16 16 15   Temp: 97.8 °F (36.6 °C)     SpO2: 100% 100% 100%   Weight: 80.7 kg (178 lb)     Height: 5' 8\" (1.727 m)       Physical Exam   Constitutional: He is oriented to person, place, and time. He appears well-developed and well-nourished. HENT:   Head: Normocephalic and atraumatic. Right Ear: External ear normal.   Left Ear: External ear normal.   Nose: Nose normal.   Mouth/Throat: Oropharynx is clear and moist.   Eyes: Conjunctivae and EOM are normal. Pupils are equal, round, and reactive to light. Neck: Normal range of motion. Neck supple. No JVD present. No tracheal deviation present. No thyromegaly present. Cardiovascular: Normal rate, regular rhythm, normal heart sounds and intact distal pulses. Pulmonary/Chest: Effort normal and breath sounds normal. He exhibits tenderness (left lower posterior lateral ribs). No step offs   Abdominal: Soft. Bowel sounds are normal. He exhibits no distension and no mass. There is no tenderness. There is no CVA tenderness. No HSM   Musculoskeletal: Normal range of motion. He exhibits no edema. No midline tenderness, no step-offs. Lymphadenopathy:     He has no cervical adenopathy. Neurological: He is alert and oriented to person, place, and time.  He has normal reflexes. No cranial nerve deficit. He exhibits normal muscle tone. Coordination normal.   No focal weakness   Skin: Skin is warm and dry. Cervical midline and lumbar surgical scar   Psychiatric: He has a normal mood and affect. His behavior is normal. Thought content normal.   Nursing note and vitals reviewed. Diagnostic Study Results     Labs -     Recent Results (from the past 12 hour(s))   URINALYSIS W/ RFLX MICROSCOPIC    Collection Time: 04/14/18 11:40 AM   Result Value Ref Range    Color YELLOW      Appearance CLEAR      Specific gravity 1.028 1.005 - 1.030      pH (UA) 5.0 5.0 - 8.0      Protein NEGATIVE  NEG mg/dL    Glucose NEGATIVE  NEG mg/dL    Ketone NEGATIVE  NEG mg/dL    Bilirubin NEGATIVE  NEG      Blood NEGATIVE  NEG      Urobilinogen 1.0 0.2 - 1.0 EU/dL    Nitrites NEGATIVE  NEG      Leukocyte Esterase NEGATIVE  NEG     CBC WITH AUTOMATED DIFF    Collection Time: 04/14/18 11:50 AM   Result Value Ref Range    WBC 8.1 4.6 - 13.2 K/uL    RBC 5.10 4.70 - 5.50 M/uL    HGB 16.1 (H) 13.0 - 16.0 g/dL    HCT 46.0 36.0 - 48.0 %    MCV 90.2 74.0 - 97.0 FL    MCH 31.6 24.0 - 34.0 PG    MCHC 35.0 31.0 - 37.0 g/dL    RDW 12.9 11.6 - 14.5 %    PLATELET 491 831 - 001 K/uL    MPV 8.3 (L) 9.2 - 11.8 FL    NEUTROPHILS 56 40 - 73 %    LYMPHOCYTES 32 21 - 52 %    MONOCYTES 7 3 - 10 %    EOSINOPHILS 4 0 - 5 %    BASOPHILS 1 0 - 2 %    ABS. NEUTROPHILS 4.6 1.8 - 8.0 K/UL    ABS. LYMPHOCYTES 2.6 0.9 - 3.6 K/UL    ABS. MONOCYTES 0.5 0.05 - 1.2 K/UL    ABS. EOSINOPHILS 0.4 0.0 - 0.4 K/UL    ABS.  BASOPHILS 0.0 0.0 - 0.06 K/UL    DF AUTOMATED     METABOLIC PANEL, COMPREHENSIVE    Collection Time: 04/14/18 11:50 AM   Result Value Ref Range    Sodium 143 136 - 145 mmol/L    Potassium 3.7 3.5 - 5.5 mmol/L    Chloride 104 100 - 108 mmol/L    CO2 32 21 - 32 mmol/L    Anion gap 7 3.0 - 18 mmol/L    Glucose 113 (H) 74 - 99 mg/dL    BUN 20 (H) 7.0 - 18 MG/DL    Creatinine 1.16 0.6 - 1.3 MG/DL    BUN/Creatinine ratio 17 12 - 20      GFR est AA >60 >60 ml/min/1.73m2    GFR est non-AA >60 >60 ml/min/1.73m2    Calcium 8.7 8.5 - 10.1 MG/DL    Bilirubin, total 0.5 0.2 - 1.0 MG/DL    ALT (SGPT) 31 16 - 61 U/L    AST (SGOT) 19 15 - 37 U/L    Alk. phosphatase 89 45 - 117 U/L    Protein, total 7.7 6.4 - 8.2 g/dL    Albumin 4.2 3.4 - 5.0 g/dL    Globulin 3.5 2.0 - 4.0 g/dL    A-G Ratio 1.2 0.8 - 1.7     LIPASE    Collection Time: 04/14/18 11:50 AM   Result Value Ref Range    Lipase 137 73 - 393 U/L   MAGNESIUM    Collection Time: 04/14/18 11:50 AM   Result Value Ref Range    Magnesium 2.0 1.6 - 2.6 mg/dL   CARDIAC PANEL,(CK, CKMB & TROPONIN)    Collection Time: 04/14/18 11:50 AM   Result Value Ref Range     39 - 308 U/L    CK - MB 1.4 <3.6 ng/ml    CK-MB Index 1.1 0.0 - 4.0 %    Troponin-I, Qt. <0.02 0.00 - 0.06 NG/ML   EKG, 12 LEAD, INITIAL    Collection Time: 04/14/18 12:39 PM   Result Value Ref Range    Ventricular Rate 63 BPM    Atrial Rate 63 BPM    P-R Interval 152 ms    QRS Duration 96 ms    Q-T Interval 402 ms    QTC Calculation (Bezet) 411 ms    Calculated P Axis 46 degrees    Calculated R Axis 57 degrees    Calculated T Axis 31 degrees    Diagnosis       Normal sinus rhythm  Normal ECG  When compared with ECG of 03-FEB-2016 18:18,  Vent. rate has decreased BY  32 BPM         Radiologic Studies -   XR RIBS LT W PA CXR MIN 3 V   Final Result   No displaced rib fracture. No pneumothorax or hemothorax. As read by the radiologist.      CT Results  (Last 48 hours)               04/14/18 1302  CT SPINE CERV WO CONT Final result    Impression:  IMPRESSION:       1. Extensive postsurgical changes from C3 to T1 with interval appearing age   indeterminate fractures of the bilateral T1 transpedicular screws. The remaining   anterior posterior cervical hardware is intact. 2. No acute osseous fracture or findings of dislocation.         3. Multilevel severe foraminal stenosis secondary to hypertrophic changes,   grossly unchanged to prior exam.       Please note that this CT was performed supine. It is highly sensitive for   fractures and dislocations but does not evaluate for ligamentous injury   including significant instability. If the patient's symptoms persist or if   otherwise clinically indicated, in erect plain film evaluation of the cervical   spine is suggested. Narrative:  CT CERVICAL SPINE       History: Syncopal episode with loss of consciousness       Comparison: CT cervical spine from 0-16 16       Technique:  A helically acquired noncontrast CT scan of the cervical spine from   the base of the skull through T1 was performed. Axial and coronal reformations   were also provided for improved anatomic evaluation. One or more dose reduction techniques were used on this CT: automated exposure   control, adjustment of the mAs and/or kVp according to patient's size, and   iterative reconstruction techniques. The specific techniques utilized on this CT   exam have been documented in the patient's electronic medical record.   ______________________   FINDINGS:   Postoperative changes of previous anterior posterior fusion. Posterior cervical   fusion hardware is present from C3 to T1, with interval fracture appearance of   the bilateral T1 pedicular screws (sagittal series image 24 and image 33). The   remaining posterior metallic hardware appears intact. Postsurgical changes of   solid anterior interbody discectomy and fusion hardware at C6-7. Postoperative   laminectomies from C4 to C7 levels. The prevertebral soft tissues are within normal limits. The vertebral bodies maintain normal height with diffuse cervical straightening   similar to prior exam.. Prominent metallic scatter artifact secondary to hardware, limiting evaluation. No visualized anterior or posterior dislocation. Stable appearance of the facets.    Unchanged appearance of multilevel bilateral foraminal stenosis secondary to degenerative etiology, not significantly progressed the prior exam. Moderate   bilateral stenosis at C3-4/severe bilateral foraminal stenosis at C4-5/severe   bilateral foraminal stenosis at C5-6/severe bilateral foraminal stenosis at   C6-7/mild to moderate stenosis at C7-T1. Visualized portions of intracranial contents are unremarkable. Lung apices: Left greater the right chronic scarring.   __________________       04/14/18 1302  CT HEAD WO CONT Final result    Impression:  IMPRESSION:       1. No CT findings of an acute intracranial abnormality. Please note that   noncontrast head CT may be normal in early acute infarct. Narrative:  EXAM: CT head       INDICATION: Choking episode with resultant loss of consciousness, left-sided   pain. COMPARISON: CT scan of the cervical spine from same day. TECHNIQUE: Axial CT imaging of the head was performed without intravenous   contrast.   One or more dose reduction techniques were used on this CT: automated exposure   control, adjustment of the mAs and/or kVp according to patient's size, and   iterative reconstruction techniques. The specific techniques utilized on this CT   exam have been documented in the patient's electronic medical record.       _______________       FINDINGS:       BRAIN AND POSTERIOR FOSSA: The sulci, folia, ventricles and basal cisterns are   within normal limits for the patient?s age. There is no intracranial hemorrhage,   mass effect, or midline shift. There are no areas of abnormal parenchymal   attenuation. EXTRA-AXIAL SPACES AND MENINGES: There are no abnormal extra-axial fluid   collections. CALVARIUM: Intact. SINUSES: Clear.        OTHER: None.       _______________               CXR Results  (Last 48 hours)    None          MEDICATIONS GIVEN:  Medications   sodium chloride 0.9 % bolus infusion 1,000 mL (0 mL IntraVENous IV Completed 4/14/18 1310)        Medical Decision Making   I am the first provider for this patient. I reviewed the vital signs, available nursing notes, past medical history, past surgical history, family history and social history. Vital Signs-Reviewed the patient's vital signs. Pulse Oximetry Analysis - 100% on RA     EKG interpretation: (Preliminary)  12:39 PM   63 bpm, NSR. No STEMI. EKG read by Srikanth Jackson MD at 12:41 PM     Records Reviewed: Nursing Notes    Provider Notes (Medical Decision Making):   DDX: fx, dislocation, pntx, rhabdomyolysis, CVA, CNS bleed, mass, electrolyte disorder    Procedures:  Procedures    ED Course:   11:52 AM Initial assessment performed. The patients presenting problems have been discussed, and they are in agreement with the care plan formulated and outlined with them. I have encouraged them to ask questions as they arise throughout their visit. 3:52 PM pt appears comfortable, no significant pain. Discussed results with pt. Offered pain medication, which pt declined. Diagnosis and Disposition       DISCHARGE NOTE:  3:52 PM  Dilip Reyes's  results have been reviewed with him. He has been counseled regarding his diagnosis, treatment, and plan. He verbally conveys understanding and agreement of the signs, symptoms, diagnosis, treatment and prognosis and additionally agrees to follow up as discussed. He also agrees with the care-plan and conveys that all of his questions have been answered. I have also provided discharge instructions for him that include: educational information regarding their diagnosis and treatment, and list of reasons why they would want to return to the ED prior to their follow-up appointment, should his condition change. He has been provided with education for proper emergency department utilization. CLINICAL IMPRESSION:    1. Chest wall pain    2. Syncope and collapse        PLAN:  1. D/C Home  2. Current Discharge Medication List        3.    Follow-up Information     Follow up With Details Comments Contact Info    [facility] Schedule an appointment as soon as possible for a visit in 2 days  [address] [address], [address] [facility] [address]    [facility]  As needed, if symptoms worsen [place] [address]  [phone]          _______________________________   Attestations:     SCRIBE ATTESTATION:  This note is prepared by Shaq Romero, acting as Scribe for Damaso Khoury MD.    PROVIDER ATTESTATION:  Damaso Khoury MD: The scribe's documentation has been prepared under my direction and personally reviewed by me in its entirety.  I confirm that the note above accurately reflects all work, treatment, procedures, and medical decision making performed by me.   _______________________________

## 2018-04-14 NOTE — ED TRIAGE NOTES
Pt states last sat choked on a drink pt states was coughing, passed out, pt states seen by PCP Dr. Arvind Abel pt c/o lt sided pain, pt called PCP, inst pt to go to ED  Pt has swelling and bulge noted to LLQ/lt side of abd. Pt continues to c/o pain   Sepsis Screening completed    (  )Patient meets SIRS criteria. ( x )Patient does not meet SIRS criteria.       SIRS Criteria is achieved when two or more of the following are present   Temperature < 96.8°F (36°C) or > 100.9°F (38.3°C)   Heart Rate > 90 beats per minute   Respiratory Rate > 20 breaths per minute   WBC count > 12,000 or <4,000 or > 10% bands

## 2018-04-14 NOTE — DISCHARGE INSTRUCTIONS
Fainting: Care Instructions  Your Care Instructions    When you faint, or pass out, you lose consciousness for a short time. A brief drop in blood flow to the brain often causes it. When you fall or lie down, more blood flows to your brain and you regain consciousness. Emotional stress, pain, or overheating-especially if you have been standing-can make you faint. In these cases, fainting is usually not serious. But fainting can be a sign of a more serious problem. Your doctor may want you to have more tests to rule out other causes. The treatment you need depends on the reason why you fainted. The doctor has checked you carefully, but problems can develop later. If you notice any problems or new symptoms, get medical treatment right away. Follow-up care is a key part of your treatment and safety. Be sure to make and go to all appointments, and call your doctor if you are having problems. It's also a good idea to know your test results and keep a list of the medicines you take. How can you care for yourself at home? · Drink plenty of fluids to prevent dehydration. If you have kidney, heart, or liver disease and have to limit fluids, talk with your doctor before you increase your fluid intake. When should you call for help? Call 911 anytime you think you may need emergency care. For example, call if:  ? · You have symptoms of a heart problem. These may include:  ¨ Chest pain or pressure. ¨ Severe trouble breathing. ¨ A fast or irregular heartbeat. ¨ Lightheadedness or sudden weakness. ¨ Coughing up pink, foamy mucus. ¨ Passing out. After you call 911, the  may tell you to chew 1 adult-strength or 2 to 4 low-dose aspirin. Wait for an ambulance. Do not try to drive yourself. ? · You have symptoms of a stroke. These may include:  ¨ Sudden numbness, tingling, weakness, or loss of movement in your face, arm, or leg, especially on only one side of your body. ¨ Sudden vision changes.   ¨ Sudden trouble speaking. ¨ Sudden confusion or trouble understanding simple statements. ¨ Sudden problems with walking or balance. ¨ A sudden, severe headache that is different from past headaches. ? · You passed out (lost consciousness) again. ? Watch closely for changes in your health, and be sure to contact your doctor if:  ? · You do not get better as expected. Where can you learn more? Go to http://dayron-karen.info/. Enter X303 in the search box to learn more about \"Fainting: Care Instructions. \"  Current as of: March 20, 2017  Content Version: 11.4  © 1681-8977 [employer]. Care instructions adapted under license by [employer] (which disclaims liability or warranty for this information). If you have questions about a medical condition or this instruction, always ask your healthcare professional. [address] any warranty or liability for your use of this information. Musculoskeletal Chest Pain: Care Instructions  Your Care Instructions    Chest pain is not always a sign that something is wrong with your heart or that you have another serious problem. The doctor thinks your chest pain is caused by strained muscles or ligaments, inflamed chest cartilage, or another problem in your chest, rather than by your heart. You may need more tests to find the cause of your chest pain. Follow-up care is a key part of your treatment and safety. Be sure to make and go to all appointments, and call your doctor if you are having problems. It's also a good idea to know your test results and keep a list of the medicines you take. How can you care for yourself at home? · Take pain medicines exactly as directed. ¨ If the doctor gave you a prescription medicine for pain, take it as prescribed. ¨ If you are not taking a prescription pain medicine, ask your doctor if you can take an over-the-counter medicine. · Rest and protect the sore area.   · Stop, change, or take a break from any activity that may be causing your pain or soreness. · Put ice or a cold pack on the sore area for 10 to 20 minutes at a time. Try to do this every 1 to 2 hours for the next 3 days (when you are awake) or until the swelling goes down. Put a thin cloth between the ice and your skin. · After 2 or 3 days, apply a heating pad set on low or a warm cloth to the area that hurts. Some doctors suggest that you go back and forth between hot and cold. · Do not wrap or tape your ribs for support. This may cause you to take smaller breaths, which could increase your risk of lung problems. · Mentholated creams such as Bengay or Icy Hot may soothe sore muscles. Follow the instructions on the package. · Follow your doctor's instructions for exercising. · Gentle stretching and massage may help you get better faster. Stretch slowly to the point just before pain begins, and hold the stretch for at least 15 to 30 seconds. Do this 3 or 4 times a day. Stretch just after you have applied heat. · As your pain gets better, slowly return to your normal activities. Any increased pain may be a sign that you need to rest a while longer. When should you call for help? Call 911 anytime you think you may need emergency care. For example, call if:  ? · You have chest pain or pressure. This may occur with:  ¨ Sweating. ¨ Shortness of breath. ¨ Nausea or vomiting. ¨ Pain that spreads from the chest to the neck, jaw, or one or both shoulders or arms. ¨ Dizziness or lightheadedness. ¨ A fast or uneven pulse. After calling 911, chew 1 adult-strength aspirin. Wait for an ambulance. Do not try to drive yourself. ? · You have sudden chest pain and shortness of breath, or you cough up blood. ?Call your doctor now or seek immediate medical care if:  ? · You have any trouble breathing. ? · Your chest pain gets worse. ? · Your chest pain occurs consistently with exercise and is relieved by rest.   ? Watch closely for changes in your health, and be sure to contact your doctor if:  ? · Your chest pain does not get better after 1 week. Where can you learn more? Go to http://dayron-karen.info/. Enter V293 in the search box to learn more about \"Musculoskeletal Chest Pain: Care Instructions. \"  Current as of: March 20, 2017  Content Version: 11.4  © 7262-2712 [employer]. Care instructions adapted under license by [facility] (which disclaims liability or warranty for this information). If you have questions about a medical condition or this instruction, always ask your healthcare professional. [place] [address] any warranty or liability for your use of this information.

## 2018-04-15 LAB
ATRIAL RATE: 63 BPM
CALCULATED P AXIS, ECG09: 46 DEGREES
CALCULATED R AXIS, ECG10: 57 DEGREES
CALCULATED T AXIS, ECG11: 31 DEGREES
DIAGNOSIS, 93000: NORMAL
P-R INTERVAL, ECG05: 152 MS
Q-T INTERVAL, ECG07: 402 MS
QRS DURATION, ECG06: 96 MS
QTC CALCULATION (BEZET), ECG08: 411 MS
VENTRICULAR RATE, ECG03: 63 BPM

## 2019-01-28 ENCOUNTER — HOSPITAL ENCOUNTER (OUTPATIENT)
Dept: LAB | Age: 59
Discharge: HOME | End: 2019-01-28
Payer: MEDICARE

## 2019-01-28 PROCEDURE — 88304 TISSUE EXAM BY PATHOLOGIST: CPT

## 2020-07-10 ENCOUNTER — HOSPITAL ENCOUNTER (OUTPATIENT)
Dept: LAB | Age: 60
Discharge: HOME | End: 2020-07-10
Payer: MEDICARE

## 2020-07-10 ENCOUNTER — HOSPITAL ENCOUNTER (OUTPATIENT)
Dept: NON INVASIVE DIAGNOSTICS | Age: 60
Discharge: HOME | End: 2020-07-10
Payer: MEDICARE

## 2020-07-10 DIAGNOSIS — L72.3 SEBACEOUS CYST: ICD-10-CM

## 2020-07-10 LAB
HCT VFR BLD AUTO: 45.2 % (ref 36–48)
HGB BLD-MCNC: 15.3 G/DL (ref 13–16)
POTASSIUM SERPL-SCNC: 4.3 MMOL/L (ref 3.5–5.5)

## 2020-07-10 PROCEDURE — 93005 ELECTROCARDIOGRAM TRACING: CPT

## 2020-07-10 PROCEDURE — 36415 COLL VENOUS BLD VENIPUNCTURE: CPT

## 2020-07-10 PROCEDURE — 85014 HEMATOCRIT: CPT

## 2020-07-10 PROCEDURE — 85018 HEMOGLOBIN: CPT

## 2020-07-10 PROCEDURE — 84132 ASSAY OF SERUM POTASSIUM: CPT

## 2020-07-11 LAB
ATRIAL RATE: 66 BPM
CALCULATED P AXIS, ECG09: 6 DEGREES
CALCULATED R AXIS, ECG10: 60 DEGREES
CALCULATED T AXIS, ECG11: 47 DEGREES
DIAGNOSIS, 93000: NORMAL
P-R INTERVAL, ECG05: 162 MS
Q-T INTERVAL, ECG07: 376 MS
QRS DURATION, ECG06: 102 MS
QTC CALCULATION (BEZET), ECG08: 394 MS
VENTRICULAR RATE, ECG03: 66 BPM

## 2020-07-13 ENCOUNTER — HOSPITAL ENCOUNTER (OUTPATIENT)
Dept: LAB | Age: 60
Discharge: HOME | End: 2020-07-13
Payer: MEDICARE

## 2020-07-13 PROCEDURE — 88304 TISSUE EXAM BY PATHOLOGIST: CPT

## 2021-05-26 ENCOUNTER — TRANSCRIBE ORDER (OUTPATIENT)
Dept: SCHEDULING | Age: 61
End: 2021-05-26

## 2021-05-26 DIAGNOSIS — M25.572 LEFT ANKLE PAIN: Primary | ICD-10-CM

## 2021-06-02 ENCOUNTER — HOSPITAL ENCOUNTER (OUTPATIENT)
Dept: MRI IMAGING | Age: 61
Discharge: HOME | End: 2021-06-02
Attending: PODIATRIST
Payer: MEDICARE

## 2021-06-02 VITALS — HEIGHT: 68 IN | BODY MASS INDEX: 26.98 KG/M2 | WEIGHT: 178 LBS

## 2021-06-02 DIAGNOSIS — M25.572 LEFT ANKLE PAIN: ICD-10-CM

## 2021-06-02 PROCEDURE — 73723 MRI JOINT LWR EXTR W/O&W/DYE: CPT

## 2021-06-02 PROCEDURE — A9576 INJ PROHANCE MULTIPACK: HCPCS | Performed by: PODIATRIST

## 2021-06-02 PROCEDURE — 74011636320 HC RX REV CODE- 636/320: Performed by: PODIATRIST

## 2021-06-02 RX ADMIN — GADOTERIDOL 15 ML: 279.3 INJECTION, SOLUTION INTRAVENOUS at 14:50

## 2021-10-12 ENCOUNTER — HOSPITAL ENCOUNTER (OUTPATIENT)
Dept: LAB | Age: 61
Discharge: HOME | End: 2021-10-12
Payer: MEDICARE

## 2021-10-12 PROCEDURE — 88341 IMHCHEM/IMCYTCHM EA ADD ANTB: CPT

## 2021-10-12 PROCEDURE — 88342 IMHCHEM/IMCYTCHM 1ST ANTB: CPT

## 2021-10-12 PROCEDURE — 88305 TISSUE EXAM BY PATHOLOGIST: CPT

## 2021-10-13 PROCEDURE — 88305 TISSUE EXAM BY PATHOLOGIST: CPT

## (undated) DEVICE — BSHR MAJOR BASIN PACK-LF: Brand: MEDLINE INDUSTRIES, INC.

## (undated) DEVICE — DRAPE TWL SURG 16X26IN BLU ORB04] ALLCARE INC]

## (undated) DEVICE — INTENDED FOR TISSUE SEPARATION, AND OTHER PROCEDURES THAT REQUIRE A SHARP SURGICAL BLADE TO PUNCTURE OR CUT.: Brand: BARD-PARKER ® CARBON RIB-BACK BLADES

## (undated) DEVICE — SUTURE PDS II SZ 0 L27IN ABSRB VLT L26MM CT-2 1/2 CIR Z334H

## (undated) DEVICE — DERMABOND SKIN ADH 0.7ML -- DERMABOND ADVANCED 12/BX

## (undated) DEVICE — PREP CHLORAPREP 10.5 ML ORG --

## (undated) DEVICE — SKIN MARKER,REGULAR TIP WITH RULER AND LABELS: Brand: DEVON

## (undated) DEVICE — KENDALL SCD EXPRESS SLEEVES, KNEE LENGTH, MEDIUM: Brand: KENDALL SCD

## (undated) DEVICE — SUT VCRL + 2-0 27IN CT2 UD -- 36/BX

## (undated) DEVICE — STERILE POLYISOPRENE POWDER-FREE SURGICAL GLOVES: Brand: PROTEXIS

## (undated) DEVICE — STERILE POLYISOPRENE POWDER-FREE SURGICAL GLOVES WITH EMOLLIENT COATING: Brand: PROTEXIS

## (undated) DEVICE — 3-0 COATED VICRYL PLUS UNDYED 1X27" SH --

## (undated) DEVICE — DEVON™ KNEE AND BODY STRAP 60" X 3" (1.5 M X 7.6 CM): Brand: DEVON

## (undated) DEVICE — REM POLYHESIVE ADULT PATIENT RETURN ELECTRODE: Brand: VALLEYLAB

## (undated) DEVICE — DBD-PACK,LAPAROTOMY,2 REINFORCED GOWNS: Brand: MEDLINE

## (undated) DEVICE — SUT MONOCRYL PLUS UD 4-0 --